# Patient Record
Sex: FEMALE | Race: WHITE
[De-identification: names, ages, dates, MRNs, and addresses within clinical notes are randomized per-mention and may not be internally consistent; named-entity substitution may affect disease eponyms.]

---

## 2018-03-19 ENCOUNTER — HOSPITAL ENCOUNTER (EMERGENCY)
Dept: HOSPITAL 17 - NEPD | Age: 58
Discharge: HOME | End: 2018-03-19
Payer: SELF-PAY

## 2018-03-19 VITALS — DIASTOLIC BLOOD PRESSURE: 78 MMHG | TEMPERATURE: 98.1 F | SYSTOLIC BLOOD PRESSURE: 132 MMHG

## 2018-03-19 VITALS — BODY MASS INDEX: 23.03 KG/M2 | HEIGHT: 66 IN | WEIGHT: 143.3 LBS

## 2018-03-19 VITALS
OXYGEN SATURATION: 100 % | SYSTOLIC BLOOD PRESSURE: 184 MMHG | RESPIRATION RATE: 18 BRPM | TEMPERATURE: 98.1 F | HEART RATE: 83 BPM | DIASTOLIC BLOOD PRESSURE: 99 MMHG

## 2018-03-19 DIAGNOSIS — B96.20: ICD-10-CM

## 2018-03-19 DIAGNOSIS — Z88.8: ICD-10-CM

## 2018-03-19 DIAGNOSIS — Z88.5: ICD-10-CM

## 2018-03-19 DIAGNOSIS — Z79.899: ICD-10-CM

## 2018-03-19 DIAGNOSIS — M19.90: ICD-10-CM

## 2018-03-19 DIAGNOSIS — Z86.73: ICD-10-CM

## 2018-03-19 DIAGNOSIS — N39.0: Primary | ICD-10-CM

## 2018-03-19 DIAGNOSIS — I10: ICD-10-CM

## 2018-03-19 LAB
BACTERIA #/AREA URNS HPF: (no result) /HPF
COLOR UR: (no result)
GLUCOSE UR STRIP-MCNC: (no result) MG/DL
HGB UR QL STRIP: (no result)
KETONES UR STRIP-MCNC: (no result) MG/DL
MUCOUS THREADS #/AREA URNS LPF: (no result) /LPF
NITRITE UR QL STRIP: (no result)
SP GR UR STRIP: 1.02 (ref 1–1.03)
URINE LEUKOCYTE ESTERASE: (no result)

## 2018-03-19 PROCEDURE — 87086 URINE CULTURE/COLONY COUNT: CPT

## 2018-03-19 PROCEDURE — 81001 URINALYSIS AUTO W/SCOPE: CPT

## 2018-03-19 PROCEDURE — 99283 EMERGENCY DEPT VISIT LOW MDM: CPT

## 2018-03-19 PROCEDURE — 87077 CULTURE AEROBIC IDENTIFY: CPT

## 2018-03-19 PROCEDURE — 87186 SC STD MICRODIL/AGAR DIL: CPT

## 2018-03-19 NOTE — PD
HPI


Chief Complaint:   Complaint


Time Seen by Provider:  02:20


Travel History


International Travel<30 days:  No


Contact w/Intl Traveler<30days:  No


Traveled to known affect area:  No





History of Present Illness


HPI


57-year-old female presents for evaluation of dysuria and increased urinary 

frequency.  Symptoms started today.  She reports a burning sensation when she 

urinates.  Denies abdominal pain, flank pain, nausea, vomiting, fevers, chills, 

vaginal discharge.  She has no other complaints at this time.





[facility]


Past Medical History


Arthritis:  Yes


Cerebrovascular Accident:  Yes


Diminished Hearing:  No


Hypertension:  Yes


Tetanus Vaccination:  Unknown


Influenza Vaccination:  No


Pregnant?:  Not Pregnant


LMP:  menapause


Tubal Ligation:  Yes





Past Surgical History


 Section:  Yes





Social History


Alcohol Use:  Yes (danyelle)


Tobacco Use:  No


Substance Use:  No





Allergies-Medications


(Allergen,Severity, Reaction):  


Coded Allergies:  


     morphine (Verified  Allergy, Severe, 3/19/18)


 swelling and "rip my skin off"


     niacin (Verified  Allergy, Severe, 3/19/18)


 "I go in and out"


Reported Meds & Prescriptions





Reported Meds & Active Scripts


Active


Fluconazole 150 Mg Tab 150 Mg PO ONCE


Macrobid (Nitrofurantoin Monoh/Nitrofur Macro) 100 Mg Cap 100 Mg PO BID 7 Days








Review of Systems


Except as stated in HPI:  all other systems reviewed are Neg





Physical Exam


Narrative


GENERAL: Well-developed well-nourished female no acute distress


SKIN: Warm and dry.


HEAD: Atraumatic. Normocephalic. 


EYES: Pupils equal and round. No scleral icterus. No injection or drainage. 


ENT: No nasal bleeding or discharge.  Mucous membranes pink and moist.


NECK: Trachea midline. No JVD. 


CARDIOVASCULAR: Regular rate and rhythm.  No murmur appreciated.


RESPIRATORY: No accessory muscle use. Clear to auscultation. Breath sounds 

equal bilaterally. 


GASTROINTESTINAL: Abdomen soft, non-tender, nondistended. Hepatic and splenic 

margins not palpable.





Data


Data


Last Documented VS





Vital Signs








  Date Time  Temp Pulse Resp B/P (MAP) Pulse Ox O2 Delivery O2 Flow Rate FiO2


 


3/19/18 00:56 98.1 83 18 184/99 (127) 100   








Orders





 Orders


Urinalysis - C+S If Indicated (3/19/18 01:34)


Urine Culture (3/19/18 01:38)


Ed Discharge Order (3/19/18 02:45)


Nitrofurantoin Monohyd Macrocr (Macrobid (3/19/18 02:45)





Labs





Laboratory Tests








Test


  3/19/18


01:38


 


Urine Color DARK-BROWN 


 


Urine Turbidity CLOUDY 


 


Urine pH 6.0 


 


Urine Specific Gravity 1.025 


 


Urine Protein 100 mg/dL 


 


Urine Glucose (UA) NEG mg/dL 


 


Urine Ketones NEG mg/dL 


 


Urine Occult Blood MOD 


 


Urine Nitrite POS 


 


Urine Bilirubin NEG 


 


Urine Urobilinogen


  LESS THAN 2.0


MG/DL


 


Urine Leukocyte Esterase NEG 


 


Urine RBC  /hpf 


 


Urine WBC  /hpf 


 


Urine Bacteria RARE /hpf 


 


Urine Mucus FEW /lpf 


 


Urine Yeast (Budding) MANY 


 


Microscopic Urinalysis Comment


  CULTURE


INDICATED











MDM


Medical Decision Making


Medical Screen Exam Complete:  Yes


Emergency Medical Condition:  Yes


Medical Record Reviewed:  Yes


Differential Diagnosis


UTI, pyelonephritis, cervicitis


Narrative Course


Urinalysis is consistent with urinary tract infection.  She will be discharged 

with Macrobid.  She is requesting a prescription for fluconazole as she 

frequently gets yeast infections after being on antibiotics.





Diagnosis





 Primary Impression:  


 Urinary tract infection





***Additional Instructions:  


Medication as prescribed.  Stay well hydrated and well-nourished.  Return for 

any emergent medical conditions.


***Med/Other Pt SpecificInfo:  Prescription(s) given


Scripts


Fluconazole (Fluconazole) 150 Mg Tab


150 MG PO ONCE for Infection, #1 TAB 0 Refills


   Prov: Adelina Good MD         3/19/18 


Nitrofurantoin Monohydrate Macrocrystals (Macrobid) 100 Mg Cap


100 MG PO BID for Infection for 7 Days, #14 CAP 0 Refills


   Prov: Adelina Good MD         3/19/18


Disposition:  01 DISCHARGE HOME


Condition:  Stable











Girma Mercer Mar 19, 2018 02:47

## 2021-12-03 ENCOUNTER — APPOINTMENT (RX ONLY)
Dept: URBAN - METROPOLITAN AREA CLINIC 156 | Facility: CLINIC | Age: 61
Setting detail: DERMATOLOGY
End: 2021-12-03

## 2021-12-03 DIAGNOSIS — L65.0 TELOGEN EFFLUVIUM: ICD-10-CM

## 2021-12-03 DIAGNOSIS — L82.1 OTHER SEBORRHEIC KERATOSIS: ICD-10-CM

## 2021-12-03 DIAGNOSIS — L21.8 OTHER SEBORRHEIC DERMATITIS: ICD-10-CM | Status: INADEQUATELY CONTROLLED

## 2021-12-03 PROCEDURE — ? COUNSELING

## 2021-12-03 PROCEDURE — 99204 OFFICE O/P NEW MOD 45 MIN: CPT

## 2021-12-03 PROCEDURE — ? PRESCRIPTION

## 2021-12-03 PROCEDURE — ? ORDER TESTS

## 2021-12-03 PROCEDURE — ? ADDITIONAL NOTES

## 2021-12-03 RX ORDER — KETOCONAZOLE 20 MG/ML
SHAMPOO, SUSPENSION TOPICAL
Qty: 120 | Refills: 4 | Status: ERX | COMMUNITY
Start: 2021-12-03

## 2021-12-03 RX ADMIN — KETOCONAZOLE: 20 SHAMPOO, SUSPENSION TOPICAL at 00:00

## 2021-12-03 ASSESSMENT — LOCATION DETAILED DESCRIPTION DERM
LOCATION DETAILED: RIGHT MEDIAL FRONTAL SCALP
LOCATION DETAILED: RIGHT SUPERIOR TEMPLE
LOCATION DETAILED: RIGHT CENTRAL PARIETAL SCALP

## 2021-12-03 ASSESSMENT — LOCATION SIMPLE DESCRIPTION DERM
LOCATION SIMPLE: RIGHT TEMPLE
LOCATION SIMPLE: SCALP
LOCATION SIMPLE: RIGHT SCALP

## 2021-12-03 ASSESSMENT — LOCATION ZONE DERM
LOCATION ZONE: SCALP
LOCATION ZONE: FACE

## 2021-12-03 NOTE — HPI: HAIR LOSS
How Did The Hair Loss Occur?: sudden in onset
How Severe Is Your Hair Loss?: moderate
Additional History: Patient had Covid and starting to loose hair.

## 2021-12-03 NOTE — PROCEDURE: MIPS QUALITY
Detail Level: Detailed
Quality 226: Preventive Care And Screening: Tobacco Use: Screening And Cessation Intervention: Patient screened for tobacco use and is an ex/non-smoker
Quality 110: Preventive Care And Screening: Influenza Immunization: Influenza Immunization Administered during Influenza season
Quality 431: Preventive Care And Screening: Unhealthy Alcohol Use - Screening: Patient not identified as an unhealthy alcohol user when screened for unhealthy alcohol use using a systematic screening method
Quality 130: Documentation Of Current Medications In The Medical Record: Current Medications Documented

## 2021-12-03 NOTE — PROCEDURE: ORDER TESTS
Billing Type: Third-Party Bill
Expected Date Of Service: 12/03/2021
Performing Laboratory: -[id number]
Bill For Surgical Tray: no

## 2022-01-12 NOTE — PROCEDURE: ADDITIONAL NOTES
Detail Level: Simple
Abnormal VS & WBC
Additional Notes: Patient consent was obtained to proceed with the visit and recommended plan of care after discussion of all risks and benefits, including the risks of COVID-19 exposure.